# Patient Record
Sex: FEMALE | URBAN - METROPOLITAN AREA
[De-identification: names, ages, dates, MRNs, and addresses within clinical notes are randomized per-mention and may not be internally consistent; named-entity substitution may affect disease eponyms.]

---

## 2020-10-20 ENCOUNTER — OFFICE VISIT (OUTPATIENT)
Dept: URBAN - METROPOLITAN AREA CLINIC 98 | Facility: CLINIC | Age: 55
End: 2020-10-20
Payer: COMMERCIAL

## 2020-10-20 ENCOUNTER — LAB OUTSIDE AN ENCOUNTER (OUTPATIENT)
Dept: URBAN - METROPOLITAN AREA CLINIC 98 | Facility: CLINIC | Age: 55
End: 2020-10-20

## 2020-10-20 VITALS
DIASTOLIC BLOOD PRESSURE: 82 MMHG | TEMPERATURE: 96.8 F | BODY MASS INDEX: 38.27 KG/M2 | HEIGHT: 63 IN | HEART RATE: 82 BPM | WEIGHT: 216 LBS | SYSTOLIC BLOOD PRESSURE: 137 MMHG

## 2020-10-20 DIAGNOSIS — R10.30 LOWER ABDOMINAL PAIN: ICD-10-CM

## 2020-10-20 DIAGNOSIS — Z12.11 COLON CANCER SCREENING: ICD-10-CM

## 2020-10-20 PROBLEM — 21522001 ABDOMINAL PAIN: Status: ACTIVE | Noted: 2020-10-20

## 2020-10-20 PROBLEM — 54586004: Status: ACTIVE | Noted: 2020-10-20

## 2020-10-20 PROBLEM — 275978004 COLON CANCER SCREENING: Status: ACTIVE | Noted: 2020-10-20

## 2020-10-20 PROCEDURE — G8417 CALC BMI ABV UP PARAM F/U: HCPCS | Performed by: INTERNAL MEDICINE

## 2020-10-20 PROCEDURE — G9622 NO UNHEAL ETOH USER: HCPCS | Performed by: INTERNAL MEDICINE

## 2020-10-20 PROCEDURE — 3017F COLORECTAL CA SCREEN DOC REV: CPT | Performed by: INTERNAL MEDICINE

## 2020-10-20 PROCEDURE — G8427 DOCREV CUR MEDS BY ELIG CLIN: HCPCS | Performed by: INTERNAL MEDICINE

## 2020-10-20 PROCEDURE — G9903 PT SCRN TBCO ID AS NON USER: HCPCS | Performed by: INTERNAL MEDICINE

## 2020-10-20 PROCEDURE — 99204 OFFICE O/P NEW MOD 45 MIN: CPT | Performed by: INTERNAL MEDICINE

## 2020-10-20 PROCEDURE — G8482 FLU IMMUNIZE ORDER/ADMIN: HCPCS | Performed by: INTERNAL MEDICINE

## 2020-10-20 RX ORDER — SODIUM, POTASSIUM,MAG SULFATES 17.5-3.13G
177ML SOLUTION, RECONSTITUTED, ORAL ORAL
Qty: 177 MILLILITER | Refills: 0 | OUTPATIENT

## 2020-10-20 NOTE — HPI-OTHER HISTORIES
Reports no significant medical history  She presents today for evaluation of 2 years of back pain radiating down to her groin Pain in b/l mid back Worse with certain positions such as laying down and getting back up Can be worse with constipation.  She has been regular lately, but can have periods where she only goes once a week. Improves with some ibuprofen No other A/A factors Typically lasts for 20-30 min She went to the ED in [place] with this pain in 2019.  Records not available.  She is not sure what was seen on a CT there, but was given an antibiotic which helped Denies weight loss

## 2020-11-30 ENCOUNTER — OFFICE VISIT (OUTPATIENT)
Dept: URBAN - METROPOLITAN AREA SURGERY CENTER 18 | Facility: SURGERY CENTER | Age: 55
End: 2020-11-30

## 2021-01-07 ENCOUNTER — OFFICE VISIT (OUTPATIENT)
Dept: URBAN - METROPOLITAN AREA SURGERY CENTER 18 | Facility: SURGERY CENTER | Age: 56
End: 2021-01-07
Payer: COMMERCIAL

## 2021-01-07 DIAGNOSIS — Z12.11 COLON CANCER SCREENING: ICD-10-CM

## 2021-01-07 DIAGNOSIS — K63.89 BACTERIAL OVERGROWTH SYNDROME: ICD-10-CM

## 2021-01-07 PROCEDURE — G9935 CANC NOT DETECTD DURING SRCN: HCPCS | Performed by: INTERNAL MEDICINE

## 2021-01-07 PROCEDURE — 45380 COLONOSCOPY AND BIOPSY: CPT | Performed by: INTERNAL MEDICINE

## 2021-01-07 PROCEDURE — G8907 PT DOC NO EVENTS ON DISCHARG: HCPCS | Performed by: INTERNAL MEDICINE

## 2021-01-07 RX ORDER — SODIUM, POTASSIUM,MAG SULFATES 17.5-3.13G
177ML SOLUTION, RECONSTITUTED, ORAL ORAL
Qty: 177 MILLILITER | Refills: 0 | Status: ACTIVE | COMMUNITY

## 2023-02-15 ENCOUNTER — DASHBOARD ENCOUNTERS (OUTPATIENT)
Age: 58
End: 2023-02-15

## 2023-02-15 ENCOUNTER — OFFICE VISIT (OUTPATIENT)
Dept: URBAN - METROPOLITAN AREA CLINIC 27 | Facility: CLINIC | Age: 58
End: 2023-02-15
Payer: COMMERCIAL

## 2023-02-15 VITALS
DIASTOLIC BLOOD PRESSURE: 88 MMHG | WEIGHT: 210 LBS | HEART RATE: 94 BPM | HEIGHT: 63 IN | BODY MASS INDEX: 37.21 KG/M2 | SYSTOLIC BLOOD PRESSURE: 138 MMHG

## 2023-02-15 DIAGNOSIS — M54.6 PAIN IN THORACIC SPINE: ICD-10-CM

## 2023-02-15 DIAGNOSIS — K59.01 CONSTIPATION: ICD-10-CM

## 2023-02-15 DIAGNOSIS — G89.29 OTHER CHRONIC PAIN: ICD-10-CM

## 2023-02-15 PROCEDURE — 99244 OFF/OP CNSLTJ NEW/EST MOD 40: CPT | Performed by: INTERNAL MEDICINE

## 2023-02-15 PROCEDURE — 99203 OFFICE O/P NEW LOW 30 MIN: CPT | Performed by: INTERNAL MEDICINE

## 2023-02-15 RX ORDER — SODIUM, POTASSIUM,MAG SULFATES 17.5-3.13G
177ML SOLUTION, RECONSTITUTED, ORAL ORAL
Qty: 177 MILLILITER | Refills: 0 | Status: DISCONTINUED | COMMUNITY

## 2023-02-15 NOTE — HPI-SCREENING
Ms. Restrepo is a 57-year-old female seen at the request of Dr. Rueda for bilateral flank/back pain. A copy of this document will be sent to the referring physician. She reports intermittent b/l aching mid-back/flank pain x 1 year. The frequency is every 2-3 months with a duration of several seconds. No known trauma. Not affected by eating or defecation. She was recently sen at an Urgent care on [facility]. Normal U/A, per the patient. She has chronic constipation that is relieved by daily OTC stool softener. Her last colonoscopy in 2021 with Dr. Mendell was normal.  The patient does not have a family history of CRC or colon polyps.m Patient denies nausea, vomiting, abdominal pain, heartburn, diarrhea, melena, hematochezia, anemia and weight loss.

## 2023-02-19 PROBLEM — 14760008: Status: ACTIVE | Noted: 2023-02-19

## 2023-02-19 PROBLEM — 82423001: Status: ACTIVE | Noted: 2023-02-19
